# Patient Record
Sex: FEMALE | Race: ASIAN | URBAN - METROPOLITAN AREA
[De-identification: names, ages, dates, MRNs, and addresses within clinical notes are randomized per-mention and may not be internally consistent; named-entity substitution may affect disease eponyms.]

---

## 2022-04-21 ENCOUNTER — APPOINTMENT (RX ONLY)
Dept: URBAN - METROPOLITAN AREA CLINIC 132 | Facility: CLINIC | Age: 37
Setting detail: DERMATOLOGY
End: 2022-04-21

## 2022-04-21 PROCEDURE — ? REASON FOR LEAVING

## 2024-06-20 ENCOUNTER — APPOINTMENT (RX ONLY)
Dept: URBAN - METROPOLITAN AREA CLINIC 132 | Facility: CLINIC | Age: 39
Setting detail: DERMATOLOGY
End: 2024-06-20

## 2024-06-20 DIAGNOSIS — L98.8 OTHER SPECIFIED DISORDERS OF THE SKIN AND SUBCUTANEOUS TISSUE: ICD-10-CM

## 2024-06-20 PROCEDURE — ? BOTOX

## 2024-06-20 PROCEDURE — ? ADDITIONAL NOTES

## 2024-06-20 NOTE — PROCEDURE: BOTOX
Additional Area 3 Units: 0
Show Right And Left Pupillary Line Units: Yes
Post-Care Instructions: Patient instructed to not lie down for 4 hours and limit physical activity for 24 hours. Patient instructed not to travel by airplane for 48 hours. Neurotoxins can take 2-10 days to kick in. Advised patient to call the office if any side effects, questions, or concerns present.\\n\\nTIPS AND REMINDERS\\nDO NOT EXERCISE TODAY!\\nRemain in vertical position for 4-6 hours after injections.\\nDo not massage or manipulate areas of injections for at least 4 hours.\\nUse ice packs if swelling occurs at the site of the injections.\\nUse treated muscles (make facial expressions and contract muscles) for the first 1-2 hours after the injection. This makes the treatment more effective.\\nDo not travel by airplane for 48 hours\\nPOSSIBLE SIDE EFFECTS OF BOTOX INJECTIONS\\nThe following minor, minimal and transient side effects may occur: minor local swelling, bruising,\\nheadache, eyelid swelling, mild nausea, and small areas of numbness. These side effects may last\\nup to two weeks.\\nMild drooping of the upper eyelid (ptosis) occurs in about 2% of patients. This is asymptomatic.\\nMost patients are not even aware this ptosis has occurred and it usually resolves in 1-2 weeks\\nwithout any treatment.\\nWHAT TO EXPECT FROM BOTOX INJECTIONS\\nCosmetic changes with reduced action of injected muscles usually will not be apparent for 1-3 days.\\nMaximum benefit usually occurs 14 days after injections.\\nIf additional touchups are necessary, you must wait a minimum of 2 weeks between treatments.\\nWHEN TO RETURN TO THE OFFICE\\nReturn to our office in 2 weeks for post-treatment evaluation.\\nWe suggest re-treatment in about 3 months to maintain maximum benefits.\\nPLEASE CALL OUR OFFICE IF YOU HAVE ANY QUESTIONS OR CONCERNS! WE ARE JUST A PHONE CALL AWAY,READY TO ASSIST YOU -[phone], ext [phone].
Dilution (U/0.1 Cc): 1
Lot #: [id number]
Consent: Written consent obtained. Risks include but not limited to lid/brow ptosis, bruising, swelling, diplopia, temporary effect, incomplete chemical denervation.
Reconstitution Date (Optional): 06/19/24
Expiration Date (Month Year): 04/2026
Forehead Units: 5
Incrementing Botox Units: By 0.5 Units
Price (Use Numbers Only, No Special Characters Or $): 967
Detail Level: Zone
[place] Units: 30